# Patient Record
Sex: FEMALE | Race: BLACK OR AFRICAN AMERICAN | NOT HISPANIC OR LATINO | Employment: OTHER | RURAL
[De-identification: names, ages, dates, MRNs, and addresses within clinical notes are randomized per-mention and may not be internally consistent; named-entity substitution may affect disease eponyms.]

---

## 2020-08-24 ENCOUNTER — HISTORICAL (OUTPATIENT)
Dept: ADMINISTRATIVE | Facility: HOSPITAL | Age: 85
End: 2020-08-24

## 2021-10-19 ENCOUNTER — HOSPITAL ENCOUNTER (OUTPATIENT)
Dept: RADIOLOGY | Facility: HOSPITAL | Age: 86
Discharge: HOME | End: 2021-10-19
Payer: MEDICARE

## 2021-10-19 VITALS — WEIGHT: 126 LBS | HEIGHT: 61 IN | BODY MASS INDEX: 23.79 KG/M2

## 2021-10-19 DIAGNOSIS — Z12.31 VISIT FOR SCREENING MAMMOGRAM: ICD-10-CM

## 2021-10-19 PROCEDURE — 77067 SCR MAMMO BI INCL CAD: CPT | Mod: 26,,, | Performed by: RADIOLOGY

## 2021-10-19 PROCEDURE — 77067 MAMMO DIGITAL SCREENING BILAT: ICD-10-PCS | Mod: 26,,, | Performed by: RADIOLOGY

## 2021-10-19 PROCEDURE — 77067 SCR MAMMO BI INCL CAD: CPT | Mod: TC

## 2022-01-17 ENCOUNTER — OFFICE VISIT (OUTPATIENT)
Dept: FAMILY MEDICINE | Facility: CLINIC | Age: 87
End: 2022-01-17
Payer: MEDICARE

## 2022-01-17 VITALS
WEIGHT: 123 LBS | DIASTOLIC BLOOD PRESSURE: 60 MMHG | SYSTOLIC BLOOD PRESSURE: 150 MMHG | HEART RATE: 60 BPM | TEMPERATURE: 97 F | BODY MASS INDEX: 23.22 KG/M2 | HEIGHT: 61 IN | RESPIRATION RATE: 16 BRPM

## 2022-01-17 DIAGNOSIS — I10 PRIMARY HYPERTENSION: Primary | ICD-10-CM

## 2022-01-17 DIAGNOSIS — R53.82 CHRONIC FATIGUE: ICD-10-CM

## 2022-01-17 DIAGNOSIS — R27.0 ATAXIA: ICD-10-CM

## 2022-01-17 LAB
ALBUMIN SERPL BCP-MCNC: 2.6 G/DL (ref 3.5–5)
ALBUMIN/GLOB SERPL: 0.6 {RATIO}
ALP SERPL-CCNC: 72 U/L (ref 55–142)
ALT SERPL W P-5'-P-CCNC: 18 U/L (ref 13–56)
ANION GAP SERPL CALCULATED.3IONS-SCNC: 7 MMOL/L (ref 7–16)
AST SERPL W P-5'-P-CCNC: 22 U/L (ref 15–37)
BASOPHILS # BLD AUTO: 0.04 K/UL (ref 0–0.2)
BASOPHILS NFR BLD AUTO: 0.9 % (ref 0–1)
BILIRUB SERPL-MCNC: 0.2 MG/DL (ref 0–1.2)
BUN SERPL-MCNC: 41 MG/DL (ref 7–18)
BUN/CREAT SERPL: 31 (ref 6–20)
CALCIUM SERPL-MCNC: 9.3 MG/DL (ref 8.5–10.1)
CHLORIDE SERPL-SCNC: 107 MMOL/L (ref 98–107)
CO2 SERPL-SCNC: 31 MMOL/L (ref 21–32)
CREAT SERPL-MCNC: 1.33 MG/DL (ref 0.55–1.02)
DIFFERENTIAL METHOD BLD: ABNORMAL
EOSINOPHIL # BLD AUTO: 0.07 K/UL (ref 0–0.5)
EOSINOPHIL NFR BLD AUTO: 1.5 % (ref 1–4)
ERYTHROCYTE [DISTWIDTH] IN BLOOD BY AUTOMATED COUNT: 12.9 % (ref 11.5–14.5)
GLOBULIN SER-MCNC: 4.1 G/DL (ref 2–4)
GLUCOSE SERPL-MCNC: 82 MG/DL (ref 74–106)
HCT VFR BLD AUTO: 37 % (ref 38–47)
HGB BLD-MCNC: 12.2 G/DL (ref 12–16)
IMM GRANULOCYTES # BLD AUTO: 0.01 K/UL (ref 0–0.04)
IMM GRANULOCYTES NFR BLD: 0.2 % (ref 0–0.4)
LYMPHOCYTES # BLD AUTO: 1.55 K/UL (ref 1–4.8)
LYMPHOCYTES NFR BLD AUTO: 33.8 % (ref 27–41)
MCH RBC QN AUTO: 32.3 PG (ref 27–31)
MCHC RBC AUTO-ENTMCNC: 33 G/DL (ref 32–36)
MCV RBC AUTO: 97.9 FL (ref 80–96)
MONOCYTES # BLD AUTO: 0.45 K/UL (ref 0–0.8)
MONOCYTES NFR BLD AUTO: 9.8 % (ref 2–6)
MPC BLD CALC-MCNC: 11.4 FL (ref 9.4–12.4)
NEUTROPHILS # BLD AUTO: 2.46 K/UL (ref 1.8–7.7)
NEUTROPHILS NFR BLD AUTO: 53.8 % (ref 53–65)
NRBC # BLD AUTO: 0 X10E3/UL
NRBC, AUTO (.00): 0 %
PLATELET # BLD AUTO: 218 K/UL (ref 150–400)
POTASSIUM SERPL-SCNC: 3.6 MMOL/L (ref 3.5–5.1)
PROT SERPL-MCNC: 6.7 G/DL (ref 6.4–8.2)
RBC # BLD AUTO: 3.78 M/UL (ref 4.2–5.4)
SODIUM SERPL-SCNC: 141 MMOL/L (ref 136–145)
T4 FREE SERPL-MCNC: 0.94 NG/DL (ref 0.76–1.46)
TSH SERPL DL<=0.005 MIU/L-ACNC: 1.87 UIU/ML (ref 0.36–3.74)
WBC # BLD AUTO: 4.58 K/UL (ref 4.5–11)

## 2022-01-17 PROCEDURE — 84443 THYROID PANEL: ICD-10-PCS | Mod: ,,, | Performed by: CLINICAL MEDICAL LABORATORY

## 2022-01-17 PROCEDURE — 85025 CBC WITH DIFFERENTIAL: ICD-10-PCS | Mod: ,,, | Performed by: CLINICAL MEDICAL LABORATORY

## 2022-01-17 PROCEDURE — 99214 PR OFFICE/OUTPT VISIT, EST, LEVL IV, 30-39 MIN: ICD-10-PCS | Mod: ,,, | Performed by: FAMILY MEDICINE

## 2022-01-17 PROCEDURE — 84439 ASSAY OF FREE THYROXINE: CPT | Mod: ,,, | Performed by: CLINICAL MEDICAL LABORATORY

## 2022-01-17 PROCEDURE — 99214 OFFICE O/P EST MOD 30 MIN: CPT | Mod: ,,, | Performed by: FAMILY MEDICINE

## 2022-01-17 PROCEDURE — 84439 THYROID PANEL: ICD-10-PCS | Mod: ,,, | Performed by: CLINICAL MEDICAL LABORATORY

## 2022-01-17 PROCEDURE — 80053 COMPREHENSIVE METABOLIC PANEL: ICD-10-PCS | Mod: ,,, | Performed by: CLINICAL MEDICAL LABORATORY

## 2022-01-17 PROCEDURE — 84443 ASSAY THYROID STIM HORMONE: CPT | Mod: ,,, | Performed by: CLINICAL MEDICAL LABORATORY

## 2022-01-17 PROCEDURE — 80053 COMPREHEN METABOLIC PANEL: CPT | Mod: ,,, | Performed by: CLINICAL MEDICAL LABORATORY

## 2022-01-17 PROCEDURE — 85025 COMPLETE CBC W/AUTO DIFF WBC: CPT | Mod: ,,, | Performed by: CLINICAL MEDICAL LABORATORY

## 2022-01-17 RX ORDER — TRAVOPROST OPHTHALMIC SOLUTION 0.04 MG/ML
SOLUTION OPHTHALMIC
COMMUNITY
Start: 2021-12-08

## 2022-01-17 RX ORDER — AMLODIPINE BESYLATE 2.5 MG/1
TABLET ORAL
COMMUNITY

## 2022-01-17 RX ORDER — DORZOLAMIDE HYDROCHLORIDE AND TIMOLOL MALEATE 20; 5 MG/ML; MG/ML
SOLUTION/ DROPS OPHTHALMIC
COMMUNITY
Start: 2021-10-12

## 2022-01-17 RX ORDER — VALSARTAN 160 MG/1
TABLET ORAL
COMMUNITY

## 2022-01-17 RX ORDER — BRIMONIDINE TARTRATE 2 MG/ML
SOLUTION/ DROPS OPHTHALMIC
COMMUNITY
Start: 2022-01-10

## 2022-01-17 NOTE — PROGRESS NOTES
Maycol Hill MD        PATIENT NAME: Kamla Garcia  : 10/16/1933  DATE: 22  MRN: [id number]      Billing Provider: Maycol Hill MD  Level of Service: [place] OFFICE/OUTPT VISIT, EST, LEVL IV, 30-39 MIN  Patient PCP Information     Provider PCP Type    Maycol Hill MD General          Reason for Visit / Chief Complaint: Hypertension and Dementia (Becoming forgetful trouble with balance)       Update PCP  Update Chief Complaint         History of Present Illness / Problem Focused Workflow     Kamla Garcia presents to the clinic with Hypertension and Dementia (Becoming forgetful trouble with balance)     Routine followup.  No significant interval change.  Accompanied by her daughter.  She does complain of difficulty with walking difficulty getting up and going.        Review of Systems     Review of Systems   Constitutional: Negative for activity change, appetite change, fever and unexpected weight change.   HENT: Positive for hearing loss. Negative for congestion, rhinorrhea, sinus pressure, sinus pain, sore throat and trouble swallowing.    Eyes: Negative for photophobia, pain, discharge and visual disturbance.   Respiratory: Negative for cough, chest tightness, wheezing and stridor.    Cardiovascular: Negative for chest pain, palpitations and leg swelling.   Gastrointestinal: Negative for abdominal pain, blood in stool, constipation, diarrhea and nausea.   Endocrine: Negative for polydipsia, polyphagia and polyuria.   Genitourinary: Negative for difficulty urinating, flank pain and hematuria.   Musculoskeletal: Negative for arthralgias and neck pain.   Skin: Negative for rash.   Allergic/Immunologic: Negative for food allergies.   Neurological: Negative for dizziness, tremors, seizures, syncope, weakness (global weakness) and headaches.   Psychiatric/Behavioral: Negative for behavioral problems, confusion, decreased concentration, dysphoric mood and hallucinations. The patient is not  nervous/anxious.         Medical / Social / Family History     Past Medical History:   Diagnosis Date    Essential hypertension     Hyperlipemia     Osteoporosis        Past Surgical History:   Procedure Laterality Date    BREAST BIOPSY      HYSTERECTOMY      SIGMOIDOSCOPY  2000    TOTAL ABDOMINAL HYSTERECTOMY W/ BILATERAL SALPINGOOPHORECTOMY  1969       Social History  Ms.  reports that she has never smoked. She has never used smokeless tobacco. She reports that she does not drink alcohol.    Family History  Ms.'s family history is not on file.    Medications and Allergies     Medications  No outpatient medications have been marked as taking for the 1/17/22 encounter (Office Visit) with Maycol Hill MD.       Allergies  Review of patient's allergies indicates:   Allergen Reactions    Codeine        Physical Examination     Vitals:    01/17/22 1442   BP: (!) 150/60   Pulse:    Resp:    Temp:      Physical Exam  Constitutional:       General: She is not in acute distress.     Appearance: Normal appearance.   HENT:      Head: Normocephalic.      Right Ear: Tympanic membrane and ear canal normal.      Left Ear: Tympanic membrane and ear canal normal.      Nose: Nose normal.      Mouth/Throat:      Mouth: Mucous membranes are moist.      Pharynx: No oropharyngeal exudate.   Eyes:      Extraocular Movements: Extraocular movements intact.      Pupils: Pupils are equal, round, and reactive to light.   Cardiovascular:      Rate and Rhythm: Normal rate and regular rhythm.      Heart sounds: No murmur heard.      Pulmonary:      Effort: Pulmonary effort is normal.      Breath sounds: Normal breath sounds. No wheezing.   Abdominal:      General: Abdomen is flat. Bowel sounds are normal.      Palpations: Abdomen is soft.      Hernia: No hernia is present.   Musculoskeletal:         General: Normal range of motion.      Cervical back: Normal range of motion and neck supple.      Right lower leg: No edema.      Left  lower leg: No edema.   Lymphadenopathy:      Cervical: No cervical adenopathy.   Skin:     General: Skin is warm and dry.      Coloration: Skin is not jaundiced.      Findings: No lesion.   Neurological:      General: No focal deficit present.      Mental Status: She is alert and oriented to person, place, and time.      Cranial Nerves: No cranial nerve deficit.      Gait: Gait abnormal.   Psychiatric:         Mood and Affect: Mood normal.         Behavior: Behavior normal.         Judgment: Judgment normal.          Assessment and Plan (including Health Maintenance)      Problem List  Smart Sets  Document Outside HM   :    Plan:   Primary hypertension  -     CBC Auto Differential; Future; Expected date: 07/17/2022  -     Comprehensive Metabolic Panel; Future; Expected date: 07/17/2022    Chronic fatigue  -     Thyroid Panel; Future; Expected date: 01/17/2022    Ataxia  -     Ambulatory referral/consult to Physical/Occupational Therapy; Future; Expected date: 01/31/2022           Health Maintenance Due   Topic Date Due    COVID-19 Vaccine (1) Never done    TETANUS VACCINE  Never done    Shingles Vaccine (1 of 2) Never done    Influenza Vaccine (1) Never done       Problem List Items Addressed This Visit    None     Visit Diagnoses     Primary hypertension    -  Primary    Relevant Orders    CBC Auto Differential (Completed)    Comprehensive Metabolic Panel (Completed)    Chronic fatigue        Relevant Orders    Thyroid Panel (Completed)    Ataxia        Relevant Orders    Ambulatory referral/consult to Physical/Occupational Therapy          Health Maintenance Topics with due status: Not Due       Topic Last Completion Date    Lipid Panel 10/24/2019       Future Appointments   Date Time Provider Department Center   12/5/2022 10:20 AM [facility]        There are no Patient Instructions on file for this visit.  Follow up in about 6 months (around 7/17/2022) for routine followup.      Signature:  Maycol Hill MD      Date of encounter: 1/17/22

## 2022-03-11 DIAGNOSIS — Z71.89 COMPLEX CARE COORDINATION: ICD-10-CM

## 2022-10-09 DIAGNOSIS — Z71.89 COMPLEX CARE COORDINATION: ICD-10-CM

## 2022-12-05 ENCOUNTER — HOSPITAL ENCOUNTER (OUTPATIENT)
Dept: RADIOLOGY | Facility: HOSPITAL | Age: 87
Discharge: HOME | End: 2022-12-05
Payer: MEDICARE

## 2022-12-05 DIAGNOSIS — Z12.31 VISIT FOR SCREENING MAMMOGRAM: ICD-10-CM

## 2022-12-05 PROCEDURE — 77067 SCR MAMMO BI INCL CAD: CPT | Mod: 26,,, | Performed by: RADIOLOGY

## 2022-12-05 PROCEDURE — 77067 SCR MAMMO BI INCL CAD: CPT | Mod: TC

## 2022-12-05 PROCEDURE — 77067 MAMMO DIGITAL SCREENING BILAT: ICD-10-PCS | Mod: 26,,, | Performed by: RADIOLOGY

## 2023-03-22 ENCOUNTER — OFFICE VISIT (OUTPATIENT)
Dept: FAMILY MEDICINE | Facility: CLINIC | Age: 88
End: 2023-03-22
Payer: MEDICARE

## 2023-03-22 VITALS
OXYGEN SATURATION: 97 % | TEMPERATURE: 98 F | DIASTOLIC BLOOD PRESSURE: 70 MMHG | RESPIRATION RATE: 16 BRPM | HEART RATE: 62 BPM | SYSTOLIC BLOOD PRESSURE: 110 MMHG | HEIGHT: 61 IN | BODY MASS INDEX: 22.28 KG/M2 | WEIGHT: 118 LBS

## 2023-03-22 DIAGNOSIS — I10 PRIMARY HYPERTENSION: Primary | ICD-10-CM

## 2023-03-22 DIAGNOSIS — R53.82 CHRONIC FATIGUE: ICD-10-CM

## 2023-03-22 DIAGNOSIS — F02.B0 MODERATE LATE ONSET ALZHEIMER'S DEMENTIA WITHOUT BEHAVIORAL DISTURBANCE, PSYCHOTIC DISTURBANCE, MOOD DISTURBANCE, OR ANXIETY: Chronic | ICD-10-CM

## 2023-03-22 DIAGNOSIS — R27.0 ATAXIA: ICD-10-CM

## 2023-03-22 DIAGNOSIS — G30.1 MODERATE LATE ONSET ALZHEIMER'S DEMENTIA WITHOUT BEHAVIORAL DISTURBANCE, PSYCHOTIC DISTURBANCE, MOOD DISTURBANCE, OR ANXIETY: Chronic | ICD-10-CM

## 2023-03-22 LAB
ALBUMIN SERPL BCP-MCNC: 2.6 G/DL (ref 3.5–5)
ALBUMIN/GLOB SERPL: 0.7 {RATIO}
ALP SERPL-CCNC: 55 U/L (ref 55–142)
ALT SERPL W P-5'-P-CCNC: 13 U/L (ref 13–56)
ANION GAP SERPL CALCULATED.3IONS-SCNC: 5 MMOL/L (ref 7–16)
AST SERPL W P-5'-P-CCNC: 19 U/L (ref 15–37)
BASOPHILS # BLD AUTO: 0.02 K/UL (ref 0–0.2)
BASOPHILS NFR BLD AUTO: 0.5 % (ref 0–1)
BILIRUB SERPL-MCNC: 0.2 MG/DL (ref ?–1.2)
BUN SERPL-MCNC: 41 MG/DL (ref 7–18)
BUN/CREAT SERPL: 39 (ref 6–20)
CALCIUM SERPL-MCNC: 8.2 MG/DL (ref 8.5–10.1)
CHLORIDE SERPL-SCNC: 111 MMOL/L (ref 98–107)
CO2 SERPL-SCNC: 29 MMOL/L (ref 21–32)
CREAT SERPL-MCNC: 1.04 MG/DL (ref 0.55–1.02)
DIFFERENTIAL METHOD BLD: ABNORMAL
EGFR (NO RACE VARIABLE) (RUSH/TITUS): 51 ML/MIN/1.73M²
EOSINOPHIL # BLD AUTO: 0.07 K/UL (ref 0–0.5)
EOSINOPHIL NFR BLD AUTO: 1.9 % (ref 1–4)
ERYTHROCYTE [DISTWIDTH] IN BLOOD BY AUTOMATED COUNT: 13.1 % (ref 11.5–14.5)
GLOBULIN SER-MCNC: 3.5 G/DL (ref 2–4)
GLUCOSE SERPL-MCNC: 98 MG/DL (ref 74–106)
HCT VFR BLD AUTO: 36.7 % (ref 38–47)
HGB BLD-MCNC: 11.7 G/DL (ref 12–16)
IMM GRANULOCYTES # BLD AUTO: 0.02 K/UL (ref 0–0.04)
IMM GRANULOCYTES NFR BLD: 0.5 % (ref 0–0.4)
LYMPHOCYTES # BLD AUTO: 0.97 K/UL (ref 1–4.8)
LYMPHOCYTES NFR BLD AUTO: 26.4 % (ref 27–41)
MCH RBC QN AUTO: 32.6 PG (ref 27–31)
MCHC RBC AUTO-ENTMCNC: 31.9 G/DL (ref 32–36)
MCV RBC AUTO: 102.2 FL (ref 80–96)
MONOCYTES # BLD AUTO: 0.27 K/UL (ref 0–0.8)
MONOCYTES NFR BLD AUTO: 7.3 % (ref 2–6)
MPC BLD CALC-MCNC: 11.5 FL (ref 9.4–12.4)
NEUTROPHILS # BLD AUTO: 2.33 K/UL (ref 1.8–7.7)
NEUTROPHILS NFR BLD AUTO: 63.4 % (ref 53–65)
NRBC # BLD AUTO: 0 X10E3/UL
NRBC, AUTO (.00): 0 %
PLATELET # BLD AUTO: 176 K/UL (ref 150–400)
POTASSIUM SERPL-SCNC: 4.4 MMOL/L (ref 3.5–5.1)
PROT SERPL-MCNC: 6.1 G/DL (ref 6.4–8.2)
RBC # BLD AUTO: 3.59 M/UL (ref 4.2–5.4)
SODIUM SERPL-SCNC: 141 MMOL/L (ref 136–145)
T4 FREE SERPL-MCNC: 0.76 NG/DL (ref 0.76–1.46)
TSH SERPL DL<=0.005 MIU/L-ACNC: 1.51 UIU/ML (ref 0.36–3.74)
VIT B12 SERPL-MCNC: 508 PG/ML (ref 193–986)
WBC # BLD AUTO: 3.68 K/UL (ref 4.5–11)

## 2023-03-22 PROCEDURE — 84439 ASSAY OF FREE THYROXINE: CPT | Mod: ,,, | Performed by: CLINICAL MEDICAL LABORATORY

## 2023-03-22 PROCEDURE — 84439 THYROID PANEL: ICD-10-PCS | Mod: ,,, | Performed by: CLINICAL MEDICAL LABORATORY

## 2023-03-22 PROCEDURE — 85025 COMPLETE CBC W/AUTO DIFF WBC: CPT | Mod: ,,, | Performed by: CLINICAL MEDICAL LABORATORY

## 2023-03-22 PROCEDURE — 99214 PR OFFICE/OUTPT VISIT, EST, LEVL IV, 30-39 MIN: ICD-10-PCS | Mod: ,,, | Performed by: FAMILY MEDICINE

## 2023-03-22 PROCEDURE — 80053 COMPREHEN METABOLIC PANEL: CPT | Mod: ,,, | Performed by: CLINICAL MEDICAL LABORATORY

## 2023-03-22 PROCEDURE — 84443 ASSAY THYROID STIM HORMONE: CPT | Mod: ,,, | Performed by: CLINICAL MEDICAL LABORATORY

## 2023-03-22 PROCEDURE — 84443 THYROID PANEL: ICD-10-PCS | Mod: ,,, | Performed by: CLINICAL MEDICAL LABORATORY

## 2023-03-22 PROCEDURE — 80053 COMPREHENSIVE METABOLIC PANEL: ICD-10-PCS | Mod: ,,, | Performed by: CLINICAL MEDICAL LABORATORY

## 2023-03-22 PROCEDURE — 99214 OFFICE O/P EST MOD 30 MIN: CPT | Mod: ,,, | Performed by: FAMILY MEDICINE

## 2023-03-22 PROCEDURE — 82607 VITAMIN B-12: CPT | Mod: ,,, | Performed by: CLINICAL MEDICAL LABORATORY

## 2023-03-22 PROCEDURE — 85025 CBC WITH DIFFERENTIAL: ICD-10-PCS | Mod: ,,, | Performed by: CLINICAL MEDICAL LABORATORY

## 2023-03-22 PROCEDURE — 82607 VITAMIN B12: ICD-10-PCS | Mod: ,,, | Performed by: CLINICAL MEDICAL LABORATORY

## 2023-03-22 NOTE — PROGRESS NOTES
Maycol Hill MD        PATIENT NAME: Kamla Garcia  : 10/16/1933  DATE: 3/22/23  MRN: [id number]      Billing Provider: Maycol Hill MD  Level of Service: [place] OFFICE/OUTPT VISIT, EST, LEVL IV, 30-39 MIN  Patient PCP Information       Provider PCP Type    Maycol Hill MD General            Reason for Visit / Chief Complaint: Dementia       Update PCP  Update Chief Complaint         History of Present Illness / Problem Focused Workflow     Kamla Garcia presents to the clinic with Dementia     Brought by granddaughter with dementia.      Review of Systems     Review of Systems   Constitutional:  Negative for activity change, appetite change, fever and unexpected weight change.   HENT:  Negative for congestion, rhinorrhea, sinus pressure, sinus pain, sore throat and trouble swallowing.    Eyes:  Negative for photophobia, pain, discharge and visual disturbance.   Respiratory:  Negative for cough, chest tightness, wheezing and stridor.    Cardiovascular:  Negative for chest pain, palpitations and leg swelling.   Gastrointestinal:  Negative for abdominal pain, blood in stool, constipation, diarrhea and nausea.   Endocrine: Negative for polydipsia, polyphagia and polyuria.   Genitourinary:  Negative for difficulty urinating, flank pain and hematuria.   Musculoskeletal:  Negative for arthralgias and neck pain.   Skin:  Negative for rash.   Allergic/Immunologic: Negative for food allergies.   Neurological:  Negative for dizziness, tremors, seizures, syncope, weakness (global weakness) and headaches.   Psychiatric/Behavioral:  Positive for confusion. Negative for behavioral problems, decreased concentration, dysphoric mood and hallucinations. The patient is not nervous/anxious.       Medical / Social / Family History     Past Medical History:   Diagnosis Date    Essential hypertension     Hyperlipemia     Osteoporosis        Past Surgical History:   Procedure Laterality Date    BREAST BIOPSY       HYSTERECTOMY      SIGMOIDOSCOPY  2000    TOTAL ABDOMINAL HYSTERECTOMY W/ BILATERAL SALPINGOOPHORECTOMY  1969       Social History  Ms.  reports that she has never smoked. She has never used smokeless tobacco. She reports that she does not drink alcohol.    Family History  Ms.'s family history is not on file.    Medications and Allergies     Medications  No outpatient medications have been marked as taking for the 3/22/23 encounter (Office Visit) with Maycol Hill MD.       Allergies  Review of patient's allergies indicates:   Allergen Reactions    Codeine        Physical Examination     Vitals:    03/22/23 1421   BP: 110/70   Pulse: 62   Resp: 16   Temp: 98.2 °F (36.8 °C)     Physical Exam  Constitutional:       General: She is not in acute distress.     Appearance: Normal appearance.   HENT:      Head: Normocephalic.      Right Ear: Tympanic membrane and ear canal normal.      Left Ear: Tympanic membrane and ear canal normal.      Nose: Nose normal.      Mouth/Throat:      Mouth: Mucous membranes are moist.      Pharynx: No oropharyngeal exudate.   Eyes:      Extraocular Movements: Extraocular movements intact.      Pupils: Pupils are equal, round, and reactive to light.   Cardiovascular:      Rate and Rhythm: Normal rate and regular rhythm.      Heart sounds: No murmur heard.  Pulmonary:      Effort: Pulmonary effort is normal.      Breath sounds: Normal breath sounds. No wheezing.   Abdominal:      General: Abdomen is flat. Bowel sounds are normal.      Palpations: Abdomen is soft.      Hernia: No hernia is present.   Musculoskeletal:         General: Normal range of motion.      Cervical back: Normal range of motion and neck supple.      Right lower leg: No edema.      Left lower leg: No edema.   Lymphadenopathy:      Cervical: No cervical adenopathy.   Skin:     General: Skin is warm and dry.      Coloration: Skin is not jaundiced.      Findings: No lesion.   Neurological:      General: No focal deficit  present.      Mental Status: She is alert. She is disoriented.      Cranial Nerves: No cranial nerve deficit.      Gait: Gait normal.      Comments: Patient got 0 of 3 on the mini cog exam   Psychiatric:         Mood and Affect: Mood normal.         Behavior: Behavior normal.         Judgment: Judgment normal.        Assessment and Plan (including Health Maintenance)      Problem List  Smart Sets  Document Outside HM   :    Plan:           Health Maintenance Due   Topic Date Due    COVID-19 Vaccine (1) Never done    TETANUS VACCINE  Never done    Shingles Vaccine (1 of 2) Never done    Influenza Vaccine (1) 09/01/2022       Problem List Items Addressed This Visit          Other    Moderate late onset Alzheimer's dementia (Chronic)     Other Visit Diagnoses       Primary hypertension    -  Primary    Relevant Orders    CBC Auto Differential (Completed)    Comprehensive Metabolic Panel (Completed)    Chronic fatigue        Relevant Orders    Thyroid Panel (Completed)    Ataxia        Relevant Orders    Vitamin B12 (Completed)            Health Maintenance Topics with due status: Not Due       Topic Last Completion Date    Lipid Panel 10/03/2020       Future Appointments   Date Time Provider Department Center   2/12/2024 10:40 AM [facility]        There are no Patient Instructions on file for this visit.  Follow up in about 6 months (around 9/22/2023) for routine followup.     Signature:  Maycol Hill MD      Date of encounter: 3/22/23

## 2023-03-26 PROBLEM — F02.B0 MODERATE LATE ONSET ALZHEIMER'S DEMENTIA: Chronic | Status: ACTIVE | Noted: 2023-03-26

## 2023-03-26 PROBLEM — G30.1 MODERATE LATE ONSET ALZHEIMER'S DEMENTIA: Chronic | Status: ACTIVE | Noted: 2023-03-26

## 2023-05-09 DIAGNOSIS — Z71.89 COMPLEX CARE COORDINATION: ICD-10-CM

## 2023-06-09 ENCOUNTER — EXTERNAL HOME HEALTH (OUTPATIENT)
Dept: HOME HEALTH SERVICES | Facility: HOSPITAL | Age: 88
End: 2023-06-09
Payer: MEDICARE

## 2023-08-07 RX ORDER — FUROSEMIDE 20 MG/1
20 TABLET ORAL DAILY
Qty: 90 TABLET | Refills: 3 | Status: SHIPPED | OUTPATIENT
Start: 2023-08-07 | End: 2023-08-08 | Stop reason: SDUPTHER

## 2023-08-08 RX ORDER — FUROSEMIDE 20 MG/1
20 TABLET ORAL DAILY
Qty: 90 TABLET | Refills: 3 | Status: SHIPPED | OUTPATIENT
Start: 2023-08-08

## 2023-08-26 RX ORDER — OMEPRAZOLE 20 MG/1
CAPSULE, DELAYED RELEASE ORAL
Qty: 90 CAPSULE | Refills: 3 | Status: SHIPPED | OUTPATIENT
Start: 2023-08-26

## 2023-12-09 DIAGNOSIS — Z71.89 COMPLEX CARE COORDINATION: ICD-10-CM

## 2024-09-16 ENCOUNTER — OFFICE VISIT (OUTPATIENT)
Dept: FAMILY MEDICINE | Facility: CLINIC | Age: 89
End: 2024-09-16
Payer: MEDICARE

## 2024-09-16 VITALS
HEART RATE: 68 BPM | BODY MASS INDEX: 21.62 KG/M2 | RESPIRATION RATE: 18 BRPM | DIASTOLIC BLOOD PRESSURE: 82 MMHG | SYSTOLIC BLOOD PRESSURE: 173 MMHG | WEIGHT: 114.5 LBS | OXYGEN SATURATION: 99 % | HEIGHT: 61 IN

## 2024-09-16 DIAGNOSIS — G30.1 MODERATE LATE ONSET ALZHEIMER'S DEMENTIA WITHOUT BEHAVIORAL DISTURBANCE, PSYCHOTIC DISTURBANCE, MOOD DISTURBANCE, OR ANXIETY: Chronic | ICD-10-CM

## 2024-09-16 DIAGNOSIS — R53.82 CHRONIC FATIGUE: ICD-10-CM

## 2024-09-16 DIAGNOSIS — I10 PRIMARY HYPERTENSION: Primary | ICD-10-CM

## 2024-09-16 DIAGNOSIS — E55.9 VITAMIN D DEFICIENCY: ICD-10-CM

## 2024-09-16 DIAGNOSIS — N18.31 CHRONIC KIDNEY DISEASE, STAGE 3A: ICD-10-CM

## 2024-09-16 DIAGNOSIS — F02.B0 MODERATE LATE ONSET ALZHEIMER'S DEMENTIA WITHOUT BEHAVIORAL DISTURBANCE, PSYCHOTIC DISTURBANCE, MOOD DISTURBANCE, OR ANXIETY: Chronic | ICD-10-CM

## 2024-09-16 DIAGNOSIS — H34.8132 CENTRAL RETINAL VEIN OCCLUSION, BILATERAL, STABLE: ICD-10-CM

## 2024-09-16 LAB
25(OH)D3 SERPL-MCNC: 31.1 NG/ML
ALBUMIN SERPL BCP-MCNC: 2.5 G/DL (ref 3.5–5)
ALBUMIN/GLOB SERPL: 0.7 {RATIO}
ALP SERPL-CCNC: 69 U/L (ref 55–142)
ALT SERPL W P-5'-P-CCNC: 21 U/L (ref 13–56)
ANION GAP SERPL CALCULATED.3IONS-SCNC: 6 MMOL/L (ref 7–16)
AST SERPL W P-5'-P-CCNC: 26 U/L (ref 15–37)
BASOPHILS # BLD AUTO: 0.02 K/UL (ref 0–0.2)
BASOPHILS NFR BLD AUTO: 0.6 % (ref 0–1)
BILIRUB SERPL-MCNC: 0.2 MG/DL (ref ?–1.2)
BUN SERPL-MCNC: 35 MG/DL (ref 7–18)
BUN/CREAT SERPL: 36 (ref 6–20)
CALCIUM SERPL-MCNC: 8.5 MG/DL (ref 8.5–10.1)
CHLORIDE SERPL-SCNC: 114 MMOL/L (ref 98–107)
CO2 SERPL-SCNC: 29 MMOL/L (ref 21–32)
CREAT SERPL-MCNC: 0.97 MG/DL (ref 0.55–1.02)
DIFFERENTIAL METHOD BLD: ABNORMAL
EGFR (NO RACE VARIABLE) (RUSH/TITUS): 56 ML/MIN/1.73M2
EOSINOPHIL # BLD AUTO: 0.06 K/UL (ref 0–0.5)
EOSINOPHIL NFR BLD AUTO: 1.8 % (ref 1–4)
ERYTHROCYTE [DISTWIDTH] IN BLOOD BY AUTOMATED COUNT: 13.6 % (ref 11.5–14.5)
GLOBULIN SER-MCNC: 3.7 G/DL (ref 2–4)
GLUCOSE SERPL-MCNC: 85 MG/DL (ref 74–106)
HCT VFR BLD AUTO: 34.5 % (ref 38–47)
HGB BLD-MCNC: 11.2 G/DL (ref 12–16)
IMM GRANULOCYTES # BLD AUTO: 0.01 K/UL (ref 0–0.04)
IMM GRANULOCYTES NFR BLD: 0.3 % (ref 0–0.4)
LYMPHOCYTES # BLD AUTO: 1.1 K/UL (ref 1–4.8)
LYMPHOCYTES NFR BLD AUTO: 33 % (ref 27–41)
MCH RBC QN AUTO: 33.4 PG (ref 27–31)
MCHC RBC AUTO-ENTMCNC: 32.5 G/DL (ref 32–36)
MCV RBC AUTO: 103 FL (ref 80–96)
MONOCYTES # BLD AUTO: 0.4 K/UL (ref 0–0.8)
MONOCYTES NFR BLD AUTO: 12 % (ref 2–6)
MPC BLD CALC-MCNC: 11.1 FL (ref 9.4–12.4)
NEUTROPHILS # BLD AUTO: 1.74 K/UL (ref 1.8–7.7)
NEUTROPHILS NFR BLD AUTO: 52.3 % (ref 53–65)
NRBC # BLD AUTO: 0 X10E3/UL
NRBC, AUTO (.00): 0 %
PLATELET # BLD AUTO: 194 K/UL (ref 150–400)
POTASSIUM SERPL-SCNC: 4 MMOL/L (ref 3.5–5.1)
PROT SERPL-MCNC: 6.2 G/DL (ref 6.4–8.2)
RBC # BLD AUTO: 3.35 M/UL (ref 4.2–5.4)
SODIUM SERPL-SCNC: 145 MMOL/L (ref 136–145)
T4 FREE SERPL-MCNC: 0.88 NG/DL (ref 0.76–1.46)
TSH SERPL DL<=0.005 MIU/L-ACNC: 2.39 UIU/ML (ref 0.36–3.74)
WBC # BLD AUTO: 3.33 K/UL (ref 4.5–11)

## 2024-09-16 PROCEDURE — 84439 ASSAY OF FREE THYROXINE: CPT | Mod: ,,, | Performed by: CLINICAL MEDICAL LABORATORY

## 2024-09-16 PROCEDURE — 84443 ASSAY THYROID STIM HORMONE: CPT | Mod: ,,, | Performed by: CLINICAL MEDICAL LABORATORY

## 2024-09-16 PROCEDURE — 82306 VITAMIN D 25 HYDROXY: CPT | Mod: ,,, | Performed by: CLINICAL MEDICAL LABORATORY

## 2024-09-16 PROCEDURE — 99214 OFFICE O/P EST MOD 30 MIN: CPT | Mod: ,,, | Performed by: NURSE PRACTITIONER

## 2024-09-16 PROCEDURE — 85025 COMPLETE CBC W/AUTO DIFF WBC: CPT | Mod: ,,, | Performed by: CLINICAL MEDICAL LABORATORY

## 2024-09-16 PROCEDURE — 80053 COMPREHEN METABOLIC PANEL: CPT | Mod: ,,, | Performed by: CLINICAL MEDICAL LABORATORY

## 2024-09-16 RX ORDER — FUROSEMIDE 20 MG/1
20 TABLET ORAL DAILY
Qty: 90 TABLET | Refills: 1 | Status: SHIPPED | OUTPATIENT
Start: 2024-09-16

## 2024-09-16 RX ORDER — AMLODIPINE BESYLATE 2.5 MG/1
2.5 TABLET ORAL DAILY
Qty: 90 TABLET | Refills: 1 | Status: SHIPPED | OUTPATIENT
Start: 2024-09-16

## 2024-09-16 NOTE — PROGRESS NOTES
Subjective:       Patient ID: Kamla Garcia is a 89 y.o. female.    Chief Complaint: Annual Exam and Constipation      Active Problem List with Overview Notes    Diagnosis Date Noted    Chronic kidney disease, stage 3a 09/17/2024    Primary hypertension 09/17/2024    Chronic fatigue 09/17/2024    Vitamin D deficiency 09/17/2024    Central retinal vein occlusion, bilateral, stable 09/17/2024    Moderate late onset Alzheimer's dementia 03/26/2023        Review of Systems   Constitutional:  Negative for chills, fatigue and fever.   HENT:  Negative for congestion, hearing loss, postnasal drip, rhinorrhea, sore throat, tinnitus and voice change.    Respiratory:  Negative for apnea, cough, choking, chest tightness and shortness of breath.    Cardiovascular:  Negative for chest pain, palpitations and leg swelling.   Gastrointestinal:  Negative for abdominal pain, constipation, diarrhea and nausea.   Genitourinary:  Negative for difficulty urinating.   Neurological:  Negative for dizziness, syncope, weakness and headaches.   Psychiatric/Behavioral:  Negative for sleep disturbance.           Objective:      Vitals:    09/16/24 1546   BP: (!) 173/82   Pulse:    Resp:       Physical Exam  Constitutional:       Appearance: Normal appearance. She is well-developed and normal weight.   HENT:      Head: Normocephalic.      Right Ear: External ear normal.      Left Ear: External ear normal.      Nose: Nose normal.      Mouth/Throat:      Lips: Pink.      Mouth: Mucous membranes are moist.      Pharynx: Oropharynx is clear.   Eyes:      General: Lids are normal.      Pupils: Pupils are equal, round, and reactive to light.   Cardiovascular:      Rate and Rhythm: Normal rate and regular rhythm.      Pulses: Normal pulses.      Heart sounds: Normal heart sounds.   Pulmonary:      Effort: Pulmonary effort is normal.      Breath sounds: Normal breath sounds.   Abdominal:      Palpations: Abdomen is soft.   Musculoskeletal:          General: Normal range of motion.      Cervical back: Normal range of motion.   Skin:     General: Skin is warm and dry.   Neurological:      Mental Status: She is alert and oriented to person, place, and time.   Psychiatric:         Attention and Perception: Attention normal.         Mood and Affect: Mood normal.         Speech: Speech normal.         Behavior: Behavior normal. Behavior is cooperative.       Assessment:       1. Primary hypertension    2. Chronic fatigue    3. Vitamin D deficiency    4. Moderate late onset Alzheimer's dementia without behavioral disturbance, psychotic disturbance, mood disturbance, or anxiety    5. Chronic kidney disease, stage 3a    6. Central retinal vein occlusion, bilateral, stable        Plan:     Problem List Items Addressed This Visit          Neuro    Moderate late onset Alzheimer's dementia (Chronic)    Current Assessment & Plan     Currently lives with great granddaughter. Care is managed with adequate family support.            Ophtho    Central retinal vein occlusion, bilateral, stable    Current Assessment & Plan     Followed by retina specialist.  Refills UTD            Cardiac/Vascular    Primary hypertension - Primary (Chronic)    Current Assessment & Plan     Has not been taking Amlodipine.  Start daily dose.  Monitor BP and keep recordings.  Follow up in 4 weeks with Dr. Hill         Relevant Medications    furosemide (LASIX) 20 MG tablet    amLODIPine (NORVASC) 2.5 MG tablet    Other Relevant Orders    CBC Auto Differential (Completed)    Comprehensive Metabolic Panel (Completed)    Thyroid Panel (Completed)    Vitamin D (Completed)       Renal/    Chronic kidney disease, stage 3a    Current Assessment & Plan     Labs stable.   Continue same.            Endocrine    Vitamin D deficiency    Current Assessment & Plan     Daily MVI with Vitamin D supplement         Relevant Orders    Vitamin D (Completed)       Other    Chronic fatigue    Current Assessment &  Plan     Labs stable.  Likely due to age.  Activity as tolerated.         Relevant Orders    Thyroid Panel (Completed)    Vitamin D (Completed)       Health Maintenance:  Health Maintenance Topics with due status: Not Due       Topic Last Completion Date    Lipid Panel 10/03/2020           Ashwini [facility]   9/16/24

## 2024-09-17 PROBLEM — E55.9 VITAMIN D DEFICIENCY: Status: ACTIVE | Noted: 2024-09-17

## 2024-09-17 PROBLEM — R53.82 CHRONIC FATIGUE: Status: ACTIVE | Noted: 2024-09-17

## 2024-09-17 PROBLEM — N18.31 CHRONIC KIDNEY DISEASE, STAGE 3A: Status: ACTIVE | Noted: 2024-09-17

## 2024-09-17 PROBLEM — H34.8132 CENTRAL RETINAL VEIN OCCLUSION, BILATERAL, STABLE: Status: ACTIVE | Noted: 2024-09-17

## 2024-09-17 PROBLEM — I10 PRIMARY HYPERTENSION: Status: ACTIVE | Noted: 2024-09-17

## 2024-09-17 PROBLEM — I10 PRIMARY HYPERTENSION: Chronic | Status: ACTIVE | Noted: 2024-09-17

## 2024-09-17 NOTE — ASSESSMENT & PLAN NOTE
Has not been taking Amlodipine.  Start daily dose.  Monitor BP and keep recordings.  Follow up in 4 weeks with Dr. Hill

## 2024-09-17 NOTE — PROGRESS NOTES
Vitamin D level is slightly low. Recommend daily Vitamin D supplement 2000iu daily. She is slightly anemic likely chronic- recommend daily MVI with iron. All other labs stable. Repeat in 1 year.

## 2024-10-09 DIAGNOSIS — Z71.89 COMPLEX CARE COORDINATION: ICD-10-CM

## 2024-10-16 ENCOUNTER — OFFICE VISIT (OUTPATIENT)
Dept: FAMILY MEDICINE | Facility: CLINIC | Age: 89
End: 2024-10-16
Payer: MEDICARE

## 2024-10-16 VITALS
RESPIRATION RATE: 20 BRPM | OXYGEN SATURATION: 98 % | DIASTOLIC BLOOD PRESSURE: 72 MMHG | HEART RATE: 61 BPM | BODY MASS INDEX: 21.14 KG/M2 | WEIGHT: 112 LBS | TEMPERATURE: 98 F | HEIGHT: 61 IN | SYSTOLIC BLOOD PRESSURE: 187 MMHG

## 2024-10-16 DIAGNOSIS — G30.1 MODERATE LATE ONSET ALZHEIMER'S DEMENTIA WITHOUT BEHAVIORAL DISTURBANCE, PSYCHOTIC DISTURBANCE, MOOD DISTURBANCE, OR ANXIETY: Chronic | ICD-10-CM

## 2024-10-16 DIAGNOSIS — Z23 NEED FOR VACCINATION: ICD-10-CM

## 2024-10-16 DIAGNOSIS — I10 PRIMARY HYPERTENSION: Primary | ICD-10-CM

## 2024-10-16 DIAGNOSIS — F02.B0 MODERATE LATE ONSET ALZHEIMER'S DEMENTIA WITHOUT BEHAVIORAL DISTURBANCE, PSYCHOTIC DISTURBANCE, MOOD DISTURBANCE, OR ANXIETY: Chronic | ICD-10-CM

## 2024-10-16 DIAGNOSIS — N18.31 CHRONIC KIDNEY DISEASE, STAGE 3A: ICD-10-CM

## 2024-10-16 DIAGNOSIS — I27.20 PULMONARY HYPERTENSION, UNSPECIFIED: ICD-10-CM

## 2024-10-16 PROCEDURE — 90653 IIV ADJUVANT VACCINE IM: CPT | Mod: ,,, | Performed by: FAMILY MEDICINE

## 2024-10-16 PROCEDURE — 99214 OFFICE O/P EST MOD 30 MIN: CPT | Mod: ,,, | Performed by: FAMILY MEDICINE

## 2024-10-16 PROCEDURE — G0008 ADMIN INFLUENZA VIRUS VAC: HCPCS | Mod: ,,, | Performed by: FAMILY MEDICINE

## 2024-10-16 RX ORDER — AMLODIPINE BESYLATE 5 MG/1
5 TABLET ORAL DAILY
Qty: 90 TABLET | Refills: 3 | Status: SHIPPED | OUTPATIENT
Start: 2024-10-16 | End: 2025-10-16

## 2024-10-16 NOTE — PROGRESS NOTES
Maycol Hill MD        PATIENT NAME: Kamla Garcia  : 10/16/1933  DATE: 10/16/24  MRN: [id number]      Billing Provider: Maycol Hill MD  Level of Service: [place] OFFICE/OUTPT VISIT, EST, LEVL IV, 30-39 MIN  Patient PCP Information       Provider PCP Type    BLAINE Aguirre General            Reason for Visit / Chief Complaint: Hypertension (Bp running high)       Update PCP  Update Chief Complaint         History of Present Illness / Problem Focused Workflow     Kamla Garcia presents to the clinic with Hypertension (Bp running high)     Routine followup.  No significant interval change      Hypertension  Pertinent negatives include no chest pain, headaches, neck pain or palpitations.     Review of Systems     Review of Systems   Constitutional:  Negative for activity change, appetite change, fever and unexpected weight change.   HENT:  Negative for congestion, rhinorrhea, sinus pressure, sinus pain, sore throat and trouble swallowing.    Eyes:  Negative for photophobia, pain, discharge and visual disturbance.   Respiratory:  Negative for cough, chest tightness, wheezing and stridor.    Cardiovascular:  Negative for chest pain, palpitations and leg swelling.   Gastrointestinal:  Negative for abdominal pain, blood in stool, constipation, diarrhea and nausea.   Endocrine: Negative for polydipsia, polyphagia and polyuria.   Genitourinary:  Negative for difficulty urinating, flank pain and hematuria.   Musculoskeletal:  Positive for gait problem. Negative for arthralgias and neck pain.   Skin:  Negative for rash.   Allergic/Immunologic: Negative for food allergies.   Neurological:  Negative for dizziness, tremors, seizures, syncope, weakness (global weakness) and headaches.   Psychiatric/Behavioral:  Negative for behavioral problems, confusion, decreased concentration, dysphoric mood and hallucinations. The patient is not nervous/anxious.         Medical / Social / Family History     Past Medical History:    Diagnosis Date    Essential hypertension     Hyperlipemia     Osteoporosis        Past Surgical History:   Procedure Laterality Date    BREAST BIOPSY      HYSTERECTOMY      SIGMOIDOSCOPY  2000    TOTAL ABDOMINAL HYSTERECTOMY W/ BILATERAL SALPINGOOPHORECTOMY  1969       Social History  Ms.  reports that she has never smoked. She has never used smokeless tobacco. She reports that she does not drink alcohol.    Family History  Ms.'s family history is not on file.    Medications and Allergies     Medications  No outpatient medications have been marked as taking for the 10/16/24 encounter (Office Visit) with Maycol Hill MD.       Allergies  Review of patient's allergies indicates:   Allergen Reactions    Codeine        Physical Examination     Vitals:    10/16/24 1515   BP: (!) 187/72   Pulse: 61   Resp: 20   Temp: 98 °F (36.7 °C)     Physical Exam  Constitutional:       General: She is not in acute distress.     Appearance: Normal appearance.   HENT:      Head: Normocephalic.      Right Ear: Tympanic membrane and ear canal normal.      Left Ear: Tympanic membrane and ear canal normal.      Nose: Nose normal.      Mouth/Throat:      Mouth: Mucous membranes are moist.      Pharynx: No oropharyngeal exudate.   Eyes:      Extraocular Movements: Extraocular movements intact.      Pupils: Pupils are equal, round, and reactive to light.   Cardiovascular:      Rate and Rhythm: Normal rate and regular rhythm.      Heart sounds: No murmur heard.  Pulmonary:      Effort: Pulmonary effort is normal.      Breath sounds: Normal breath sounds. No wheezing.   Abdominal:      General: Abdomen is flat. Bowel sounds are normal.      Palpations: Abdomen is soft.      Hernia: No hernia is present.   Musculoskeletal:         General: Normal range of motion.      Cervical back: Normal range of motion and neck supple.      Right lower leg: No edema.      Left lower leg: No edema.   Lymphadenopathy:      Cervical: No cervical  adenopathy.   Skin:     General: Skin is warm and dry.      Coloration: Skin is not jaundiced.      Findings: No lesion.   Neurological:      General: No focal deficit present.      Mental Status: She is alert.      Cranial Nerves: No cranial nerve deficit.      Gait: Gait abnormal.   Psychiatric:         Mood and Affect: Mood normal.         Behavior: Behavior normal.         Judgment: Judgment normal.          Assessment and Plan (including Health Maintenance)      Problem List  Smart Sets  Document Outside HM   :    Plan:     1. Need for vaccination    -     influenza (adjuvanted) (Fluad) 45 mcg/0.5 mL IM vaccine (> or = 66 yo) 0.5 mL    2. Pulmonary hypertension, unspecified            Health Maintenance Due   Topic Date Due    TETANUS VACCINE  Never done    Shingles Vaccine (1 of 2) Never done    RSV Vaccine (Age 60+ and Pregnant patients) (1 - 1-dose 75+ series) Never done    COVID-19 Vaccine (1 - 2024-25 season) Never done       1. Primary hypertension  -     amLODIPine (NORVASC) 5 MG tablet; Take 1 tablet (5 mg total) by mouth once daily.  Dispense: 90 tablet; Refill: 3    2. Need for vaccination  -     influenza (adjuvanted) (Fluad) 45 mcg/0.5 mL IM vaccine (> or = 66 yo) 0.5 mL    3. Pulmonary hypertension, unspecified    4. Chronic kidney disease, stage 3a    5. Moderate late onset Alzheimer's dementia without behavioral disturbance, psychotic disturbance, mood disturbance, or anxiety         Health Maintenance Topics with due status: Not Due       Topic Last Completion Date    Lipid Panel 10/03/2020       Future Appointments   Date Time Provider Department Center   11/18/2024  2:10 PM Maycol Hill MD [facility]        There are no Patient Instructions on file for this visit.  Follow up in about 6 months (around 4/16/2025) for routine followup.     Signature:  Maycol Hill MD      Date of encounter: 10/16/24

## 2025-02-24 ENCOUNTER — OFFICE VISIT (OUTPATIENT)
Dept: FAMILY MEDICINE | Facility: CLINIC | Age: OVER 89
End: 2025-02-24
Payer: MEDICARE

## 2025-02-24 VITALS
WEIGHT: 115.81 LBS | DIASTOLIC BLOOD PRESSURE: 62 MMHG | SYSTOLIC BLOOD PRESSURE: 139 MMHG | BODY MASS INDEX: 21.86 KG/M2 | HEIGHT: 61 IN | RESPIRATION RATE: 18 BRPM | OXYGEN SATURATION: 98 % | HEART RATE: 64 BPM

## 2025-02-24 DIAGNOSIS — H34.8132 CENTRAL RETINAL VEIN OCCLUSION, BILATERAL, STABLE: ICD-10-CM

## 2025-02-24 DIAGNOSIS — M79.604 PAIN IN BOTH LOWER EXTREMITIES: ICD-10-CM

## 2025-02-24 DIAGNOSIS — N18.31 CHRONIC KIDNEY DISEASE, STAGE 3A: ICD-10-CM

## 2025-02-24 DIAGNOSIS — M79.605 PAIN IN BOTH LOWER EXTREMITIES: ICD-10-CM

## 2025-02-24 DIAGNOSIS — G30.1 MODERATE LATE ONSET ALZHEIMER'S DEMENTIA WITHOUT BEHAVIORAL DISTURBANCE, PSYCHOTIC DISTURBANCE, MOOD DISTURBANCE, OR ANXIETY: ICD-10-CM

## 2025-02-24 DIAGNOSIS — F02.B0 MODERATE LATE ONSET ALZHEIMER'S DEMENTIA WITHOUT BEHAVIORAL DISTURBANCE, PSYCHOTIC DISTURBANCE, MOOD DISTURBANCE, OR ANXIETY: ICD-10-CM

## 2025-02-24 DIAGNOSIS — I10 PRIMARY HYPERTENSION: Primary | ICD-10-CM

## 2025-02-24 LAB
ALBUMIN SERPL BCP-MCNC: 2.6 G/DL (ref 3.4–4.8)
ALBUMIN/GLOB SERPL: 0.7 {RATIO}
ALP SERPL-CCNC: 61 U/L (ref 40–150)
ALT SERPL W P-5'-P-CCNC: 10 U/L
ANION GAP SERPL CALCULATED.3IONS-SCNC: 11 MMOL/L (ref 7–16)
AST SERPL W P-5'-P-CCNC: 31 U/L (ref 5–34)
BASOPHILS # BLD AUTO: 0.03 K/UL (ref 0–0.2)
BASOPHILS NFR BLD AUTO: 0.3 % (ref 0–1)
BILIRUB SERPL-MCNC: 0.2 MG/DL
BUN SERPL-MCNC: 25 MG/DL (ref 10–20)
BUN/CREAT SERPL: 24 (ref 6–20)
CALCIUM SERPL-MCNC: 8.3 MG/DL (ref 8.4–10.2)
CHLORIDE SERPL-SCNC: 105 MMOL/L (ref 98–111)
CO2 SERPL-SCNC: 29 MMOL/L (ref 23–31)
CREAT SERPL-MCNC: 1.03 MG/DL (ref 0.55–1.02)
DIFFERENTIAL METHOD BLD: ABNORMAL
EGFR (NO RACE VARIABLE) (RUSH/TITUS): 51 ML/MIN/1.73M2
EOSINOPHIL # BLD AUTO: 0.07 K/UL (ref 0–0.5)
EOSINOPHIL NFR BLD AUTO: 0.8 % (ref 1–4)
ERYTHROCYTE [DISTWIDTH] IN BLOOD BY AUTOMATED COUNT: 13.1 % (ref 11.5–14.5)
GLOBULIN SER-MCNC: 3.6 G/DL (ref 2–4)
GLUCOSE SERPL-MCNC: 94 MG/DL (ref 75–121)
HCT VFR BLD AUTO: 39.5 % (ref 38–47)
HGB BLD-MCNC: 12.4 G/DL (ref 12–16)
IMM GRANULOCYTES # BLD AUTO: 0.01 K/UL (ref 0–0.04)
IMM GRANULOCYTES NFR BLD: 0 % (ref 0–0.4)
LYMPHOCYTES # BLD AUTO: 1.19 K/UL (ref 1–4.8)
LYMPHOCYTES NFR BLD AUTO: 32.9 % (ref 27–41)
MAGNESIUM SERPL-MCNC: 2.2 MG/DL (ref 1.6–2.6)
MCH RBC QN AUTO: 32.4 PG (ref 27–31)
MCHC RBC AUTO-ENTMCNC: 31.4 G/DL (ref 32–36)
MCV RBC AUTO: 103.1 FL (ref 80–96)
MONOCYTES # BLD AUTO: 0.46 K/UL (ref 0–0.8)
MONOCYTES NFR BLD AUTO: 12.7 % (ref 2–6)
MPC BLD CALC-MCNC: 12.5 FL (ref 9.4–12.4)
NEUTROPHILS # BLD AUTO: 1.86 K/UL (ref 1.8–7.7)
NEUTROPHILS NFR BLD AUTO: 51.4 % (ref 53–65)
PLATELET # BLD AUTO: 170 K/UL (ref 150–400)
PLATELET MORPHOLOGY: ABNORMAL
POTASSIUM SERPL-SCNC: 4.1 MMOL/L (ref 3.5–5.1)
PROT SERPL-MCNC: 6.2 G/DL (ref 5.8–7.6)
RBC # BLD AUTO: 3.83 M/UL (ref 4.2–5.4)
RBC MORPH BLD: NORMAL
SODIUM SERPL-SCNC: 141 MMOL/L (ref 136–145)
WBC # BLD AUTO: 3.62 K/UL (ref 4.5–11)

## 2025-02-24 PROCEDURE — 85025 COMPLETE CBC W/AUTO DIFF WBC: CPT | Mod: ,,, | Performed by: CLINICAL MEDICAL LABORATORY

## 2025-02-24 PROCEDURE — 80053 COMPREHEN METABOLIC PANEL: CPT | Mod: ,,, | Performed by: CLINICAL MEDICAL LABORATORY

## 2025-02-24 PROCEDURE — 99214 OFFICE O/P EST MOD 30 MIN: CPT | Mod: ,,, | Performed by: NURSE PRACTITIONER

## 2025-02-24 PROCEDURE — 83735 ASSAY OF MAGNESIUM: CPT | Mod: ,,, | Performed by: CLINICAL MEDICAL LABORATORY

## 2025-02-24 RX ORDER — FUROSEMIDE 20 MG/1
20 TABLET ORAL DAILY
Qty: 90 TABLET | Refills: 1 | Status: SHIPPED | OUTPATIENT
Start: 2025-02-24

## 2025-02-24 NOTE — PROGRESS NOTES
Subjective:       Patient ID: Kamla Garcia is a 89 y.o. female.    Chief Complaint: Leg Swelling (Pt is in due to swelling in both legs ), Hypertension (Pt is in due to high bp ), and Annual Exam (Discussed care gaps and pt is interested  in tetanus vaccine )      Active Problem List with Overview Notes    Diagnosis Date Noted    Pain in both lower extremities 02/24/2025    Pulmonary hypertension, unspecified 10/16/2024    Chronic kidney disease, stage 3a 09/17/2024    Primary hypertension 09/17/2024    Chronic fatigue 09/17/2024    Vitamin D deficiency 09/17/2024    Central retinal vein occlusion, bilateral, stable 09/17/2024    Moderate late onset Alzheimer's dementia 03/26/2023        Review of Systems   Constitutional:  Negative for chills, fatigue and fever.   HENT:  Negative for congestion, hearing loss, postnasal drip, rhinorrhea, sore throat, tinnitus and voice change.    Respiratory:  Negative for apnea, cough, choking, chest tightness and shortness of breath.    Cardiovascular:  Positive for leg swelling. Negative for chest pain and palpitations.   Gastrointestinal:  Negative for abdominal pain, constipation, diarrhea and nausea.   Genitourinary:  Negative for difficulty urinating.   Musculoskeletal:  Positive for arthralgias and myalgias.   Neurological:  Negative for dizziness, syncope, weakness and headaches.   Psychiatric/Behavioral:  Negative for sleep disturbance.         CBC:  Recent Labs   Lab 03/22/23  1458 09/16/24  1612   WBC 3.68 L 3.33 L   RBC 3.59 L 3.35 L   Hemoglobin 11.7 L 11.2 L   Hematocrit 36.7 L 34.5 L   Platelet Count 176 194   .2 H 103.0 H   MCH 32.6 H 33.4 H   MCHC 31.9 L 32.5     CMP:  Recent Labs   Lab 03/22/23  1458 09/16/24  1612   Glucose 98 85   Calcium 8.2 L 8.5   Albumin 2.6 L 2.5 L   Total Protein 6.1 L 6.2 L   Sodium 141 145   Potassium 4.4 4.0   CO2 29 29   Chloride 111 H 114 H   BUN 41 H 35 H   Creatinine 1.04 H 0.97   Alk Phos 55 69   ALT 13 21   AST 19 26    Bilirubin, Total 0.2 0.2     LIPIDS:  Recent Labs   Lab 09/16/24  1612   TSH 2.390     TSH:  Recent Labs   Lab 03/22/23  1458 09/16/24  1612   TSH 1.510 2.390        Objective:      Vitals:    02/24/25 1448   BP: 139/62   Pulse: 64   Resp: 18      Physical Exam  Constitutional:       Appearance: Normal appearance. She is well-developed and normal weight.   HENT:      Head: Normocephalic.      Right Ear: External ear normal.      Left Ear: External ear normal.      Nose: Nose normal.      Mouth/Throat:      Lips: Pink.      Mouth: Mucous membranes are moist.      Pharynx: Oropharynx is clear.   Eyes:      General: Lids are normal.      Pupils: Pupils are equal, round, and reactive to light.   Cardiovascular:      Rate and Rhythm: Normal rate and regular rhythm.      Pulses: Normal pulses.      Heart sounds: Normal heart sounds.   Pulmonary:      Effort: Pulmonary effort is normal.      Breath sounds: Normal breath sounds.   Abdominal:      Palpations: Abdomen is soft.   Musculoskeletal:         General: Normal range of motion.      Cervical back: Normal range of motion.   Skin:     General: Skin is warm and dry.   Neurological:      Mental Status: She is alert and oriented to person, place, and time.   Psychiatric:         Attention and Perception: Attention normal.         Mood and Affect: Mood normal.         Speech: Speech normal.         Behavior: Behavior normal. Behavior is cooperative.       Assessment:       1. Primary hypertension    2. Pain in both lower extremities    3. Chronic kidney disease, stage 3a    4. Moderate late onset Alzheimer's dementia without behavioral disturbance, psychotic disturbance, mood disturbance, or anxiety    5. Central retinal vein occlusion, bilateral, stable        Plan:     Problem List Items Addressed This Visit          Neuro    Moderate late onset Alzheimer's dementia (Chronic)       Ophtho    Central retinal vein occlusion, bilateral, stable (Chronic)    Current  Assessment & Plan   Followed by retina specialist.  Refills UTD            Cardiac/Vascular    Primary hypertension - Primary (Chronic)    Current Assessment & Plan   /62  The current medical regimen is effective;  continue present plan and medications.           Relevant Medications    furosemide (LASIX) 20 MG tablet    Other Relevant Orders    Comprehensive Metabolic Panel    CBC Auto Differential    Magnesium       Renal/    Chronic kidney disease, stage 3a    Current Assessment & Plan   Labs today- call results.  Continue same.            Orthopedic    Pain in both lower extremities    Current Assessment & Plan   Labs today- call results.         Relevant Orders    Comprehensive Metabolic Panel    Magnesium       Health Maintenance:  Health Maintenance Topics with due status: Not Due       Topic Last Completion Date    Lipid Panel 10/03/2020           Ashwini Fox   [facility]   2/24/25

## 2025-03-05 ENCOUNTER — RESULTS FOLLOW-UP (OUTPATIENT)
Dept: FAMILY MEDICINE | Facility: CLINIC | Age: OVER 89
End: 2025-03-05

## 2025-03-05 RX ORDER — QUETIAPINE FUMARATE 25 MG/1
TABLET, FILM COATED ORAL
COMMUNITY
End: 2025-03-05 | Stop reason: SDUPTHER

## 2025-03-05 RX ORDER — QUETIAPINE FUMARATE 25 MG/1
25 TABLET, FILM COATED ORAL DAILY
Qty: 90 TABLET | Refills: 3 | Status: SHIPPED | OUTPATIENT
Start: 2025-03-05

## 2025-03-10 ENCOUNTER — TELEPHONE (OUTPATIENT)
Dept: NEPHROLOGY | Facility: CLINIC | Age: OVER 89
End: 2025-03-10
Payer: MEDICARE

## 2025-03-17 ENCOUNTER — OFFICE VISIT (OUTPATIENT)
Dept: NEPHROLOGY | Facility: CLINIC | Age: OVER 89
End: 2025-03-17
Payer: MEDICARE

## 2025-03-17 VITALS
RESPIRATION RATE: 18 BRPM | DIASTOLIC BLOOD PRESSURE: 62 MMHG | BODY MASS INDEX: 21.75 KG/M2 | HEART RATE: 67 BPM | SYSTOLIC BLOOD PRESSURE: 112 MMHG | OXYGEN SATURATION: 100 % | WEIGHT: 115.19 LBS | HEIGHT: 61 IN

## 2025-03-17 DIAGNOSIS — N18.31 CHRONIC KIDNEY DISEASE, STAGE 3A: ICD-10-CM

## 2025-03-17 DIAGNOSIS — R60.9 EDEMA, UNSPECIFIED TYPE: Primary | ICD-10-CM

## 2025-03-17 DIAGNOSIS — I10 PRIMARY HYPERTENSION: ICD-10-CM

## 2025-03-17 PROCEDURE — 99214 OFFICE O/P EST MOD 30 MIN: CPT | Mod: PBBFAC | Performed by: NURSE PRACTITIONER

## 2025-03-17 PROCEDURE — 99999 PR PBB SHADOW E&M-EST. PATIENT-LVL IV: CPT | Mod: PBBFAC,,, | Performed by: NURSE PRACTITIONER

## 2025-03-17 PROCEDURE — 99203 OFFICE O/P NEW LOW 30 MIN: CPT | Mod: S$PBB,,, | Performed by: NURSE PRACTITIONER

## 2025-03-17 RX ORDER — FUROSEMIDE 40 MG/1
40 TABLET ORAL DAILY
Qty: 90 TABLET | Refills: 3 | Status: SHIPPED | OUTPATIENT
Start: 2025-03-17 | End: 2026-03-17

## 2025-03-17 NOTE — PROGRESS NOTES
[facility] History and Physical  Patient Name: Kamla Garcia  MRN: [id number]  Age: 89 y.o.  : 10/16/1933    Date: 3/17/2025     Chief Complaint: Establish Care    HPI :   Ms Garcia presents to Nephrology clinic to establish care. She is followed by Dr. Maycol Hill as her primary care provider. Referred by Ashwini Fox NP.     HTN: diagnosed at least 10 years ago but patient is unable to answer and granddaughter took over care in . Current regimen includes Amlodipine 5 mg daily, Lasix 20 mg daily    Nephrology history: No FH of kidney disease, no nephrolithiasis, or recurrent UTIs. No OTC medications including NSAIDs.     Patient denies any CP, SOB, peripheral edema, dysuria, hematuria, changes in urinary habits, or increased frequency of urination.     Past Medical History:  has a past medical history of Essential hypertension, Hyperlipemia, and Osteoporosis.     Past Surgical History:   has a past surgical history that includes Total abdominal hysterectomy w/ bilateral salpingoophorectomy (); Sigmoidoscopy (); Hysterectomy; and Breast biopsy.     Family History:  family history is not on file. No family history of kidney disease.     Social History:   reports that she has never smoked. She has never used smokeless tobacco. She reports that she does not drink alcohol and does not use drugs.     Allergies: has no known allergies.     Medications: Reviewed including OTC medications, herbal supplements, and NSAIDS.     Old records have been reviewed.      Review of Systems:  ROS: A 10 point ROS was completed and found to be negative except for that mentioned above.          Physical Exam:  Vitals:    25 1433   BP: 112/62   Pulse: 67   Resp: 18       Constitutional: sitting in chair, in NAD  Eyes: white sclera  ENMT: moist mucus membranes, nares patent  Cardiovascular: normal rate, S1/S2 noted, no edema  Respiratory: symmetrical chest  expansion, CTA-B  Gastrointestinal: +BS, soft, NT/ND  Musculoskeletal: normal, no joint erythema/effusions  Skin: no rash, no purpura, warm extremities  Neurological: Patient is very hard of hearing, unable to determine AAO    Labs:   Lab Results   Component Value Date     02/24/2025    K 4.1 02/24/2025    CREATININE 1.03 (H) 02/24/2025    ALT 10 02/24/2025    AST 31 02/24/2025    TSH 2.390 09/16/2024    WBC 3.62 (L) 02/24/2025    HGB 12.4 02/24/2025    HCT 39.5 02/24/2025     02/24/2025        Otherwise Reviewed    Assessment/Plan:       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 Baseline sCr around 0.9-1.04, recent sCr 1.03, stable. Counseled to avoid nephrotoxic agents such as NSAIDs. Renal function has been stable since 2022. Will fluctuate based on volume status but otherwise patient can follow up PRN.    Proteinuria: urine Prot:Creat ratio. Patient is not on RAAS blockade.     Anemia: CBC reviewed    BMD: Calcium and Phosphorus PTH, Vit D     HTN: well controlled with current meds       RTC PRN      Signature:             BLAINE Miller  [facility]  [address] [place] [address] [place] [address]  [phone]        30 minutes of total time spent on the encounter, which includes face to face time and non-face to face time preparing to see the patient (eg, review of tests), Obtaining and/or reviewing separately obtained history, Documenting clinical information in the electronic or other health record, Independently interpreting results (not separately reported) and communicating results to the patient/family/caregiver, or Care coordination (not separately reported).       Date of encounter: 3/17/25